# Patient Record
Sex: FEMALE | Race: BLACK OR AFRICAN AMERICAN | NOT HISPANIC OR LATINO | URBAN - METROPOLITAN AREA
[De-identification: names, ages, dates, MRNs, and addresses within clinical notes are randomized per-mention and may not be internally consistent; named-entity substitution may affect disease eponyms.]

---

## 2018-06-16 ENCOUNTER — EMERGENCY (EMERGENCY)
Facility: HOSPITAL | Age: 23
LOS: 0 days | Discharge: HOME | End: 2018-06-16
Attending: STUDENT IN AN ORGANIZED HEALTH CARE EDUCATION/TRAINING PROGRAM
Payer: COMMERCIAL

## 2018-06-16 VITALS
SYSTOLIC BLOOD PRESSURE: 102 MMHG | RESPIRATION RATE: 17 BRPM | HEART RATE: 78 BPM | DIASTOLIC BLOOD PRESSURE: 62 MMHG | TEMPERATURE: 98 F | OXYGEN SATURATION: 98 %

## 2018-06-16 VITALS
TEMPERATURE: 98 F | HEIGHT: 67 IN | WEIGHT: 293 LBS | OXYGEN SATURATION: 100 % | SYSTOLIC BLOOD PRESSURE: 93 MMHG | RESPIRATION RATE: 18 BRPM | DIASTOLIC BLOOD PRESSURE: 52 MMHG | HEART RATE: 81 BPM

## 2018-06-16 LAB
APPEARANCE UR: CLEAR — SIGNIFICANT CHANGE UP
BACTERIA # UR AUTO: ABNORMAL
BILIRUB UR-MCNC: NEGATIVE — SIGNIFICANT CHANGE UP
COLOR SPEC: YELLOW — SIGNIFICANT CHANGE UP
DIFF PNL FLD: ABNORMAL
EPI CELLS # UR: ABNORMAL
GLUCOSE UR QL: NEGATIVE MG/DL — SIGNIFICANT CHANGE UP
HCG UR QL: NEGATIVE — SIGNIFICANT CHANGE UP
KETONES UR-MCNC: NEGATIVE — SIGNIFICANT CHANGE UP
LEUKOCYTE ESTERASE UR-ACNC: ABNORMAL
NITRITE UR-MCNC: NEGATIVE — SIGNIFICANT CHANGE UP
PH UR: 7 — SIGNIFICANT CHANGE UP (ref 5–8)
PROT UR-MCNC: 15 MG/DL
RBC CASTS # UR COMP ASSIST: ABNORMAL /HPF (ref 0–4)
SP GR SPEC: 1.01 — SIGNIFICANT CHANGE UP (ref 1.01–1.02)
UROBILINOGEN FLD QL: 1 MG/DL
WBC UR QL: SIGNIFICANT CHANGE UP

## 2018-06-16 PROCEDURE — 76830 TRANSVAGINAL US NON-OB: CPT | Mod: 26

## 2018-06-16 PROCEDURE — 99284 EMERGENCY DEPT VISIT MOD MDM: CPT

## 2018-06-16 NOTE — ED ADULT NURSE NOTE - OBJECTIVE STATEMENT
pt states " I have been having right sided pelvic pain for the past 2 weeks." pt denies any bleeding or pain.

## 2018-06-16 NOTE — ED ADULT TRIAGE NOTE - CHIEF COMPLAINT QUOTE
pt presents to the ED with c/o pelvic pain states she thinks her IUD dislodged states GYN could not find it with intravaginal sonogram, denies vaginal bleeding our discharge

## 2018-06-16 NOTE — ED PROVIDER NOTE - CARE PLAN
Principal Discharge DX:	IUD check up
Requires maximal assistance to don lumbar brace seated at EOB/minimum assist (75% patients effort)

## 2018-06-16 NOTE — ED ADULT NURSE NOTE - ADDITIONAL PRINTED INSTRUCTIONS GIVEN
discharged home, instructed to follow up with GYN, Dr Grant, verbalized understanding of instructions

## 2018-06-16 NOTE — ED PROVIDER NOTE - OBJECTIVE STATEMENT
22 year old female presents today sent in by her gynecologist for an official sonogram, pt was in the office today to have her IUD removed, she did have a sono in the office but the physician was unable to visualize it, pt is concerned that it may dislodged and reports intermittent right sided pains (-) nausea or vomiting (-) vaginal bleeding (-) back pains

## 2018-06-16 NOTE — ED PROVIDER NOTE - PROGRESS NOTE DETAILS
endorsed by dr. ashley pending sono fo ? IUD dislodgement. IUD noted possibly in cervix. I examined pt and unable to find string. abd soft, pt nontoxic. d/w dr. vivar states for outpt hysteroscopic removal. Discussed results and outcome of testing with the patient.  Patient given copy of available results. Patient advised to please follow up with their PMD within the next 24 hours and return to the Emergency Department for worsening symptoms or any other concerns.

## 2018-06-16 NOTE — ED ADULT NURSE REASSESSMENT NOTE - NS ED NURSE REASSESS COMMENT FT1
assumed care at this time- patient is alert and orientedx4, no complaints made, no distress/discomfort observed

## 2018-06-18 DIAGNOSIS — R10.2 PELVIC AND PERINEAL PAIN: ICD-10-CM

## 2018-06-18 DIAGNOSIS — Z97.5 PRESENCE OF (INTRAUTERINE) CONTRACEPTIVE DEVICE: ICD-10-CM

## 2018-06-18 DIAGNOSIS — Z91.013 ALLERGY TO SEAFOOD: ICD-10-CM

## 2020-09-18 NOTE — ED ADULT NURSE NOTE - BREATH SOUNDS, MLM
Admit to ICU from ER on vent

PORTIAJAMESSHANNON PIKE admitted to ICU via gurney on cardiac monitor, intubated and being bagged by 
Respiratory Therapist. Patient transfered to bed, connected to mechanical ventilator by 
therapist, DANIKA at bedside.  Patient connected to ICU monitoring, weighed by bedscale, 
oriented to Augustine Shah, primary RN, unit, ventilator and sedation. Clear

## 2021-06-11 ENCOUNTER — TRANSCRIPTION ENCOUNTER (OUTPATIENT)
Age: 26
End: 2021-06-11

## 2021-06-12 ENCOUNTER — TRANSCRIPTION ENCOUNTER (OUTPATIENT)
Age: 26
End: 2021-06-12

## 2021-06-12 ENCOUNTER — INPATIENT (INPATIENT)
Facility: HOSPITAL | Age: 26
LOS: 0 days | Discharge: HOME | End: 2021-06-12
Attending: SURGERY | Admitting: SURGERY
Payer: COMMERCIAL

## 2021-06-12 ENCOUNTER — RESULT REVIEW (OUTPATIENT)
Age: 26
End: 2021-06-12

## 2021-06-12 VITALS
OXYGEN SATURATION: 94 % | HEART RATE: 85 BPM | SYSTOLIC BLOOD PRESSURE: 107 MMHG | RESPIRATION RATE: 18 BRPM | DIASTOLIC BLOOD PRESSURE: 61 MMHG

## 2021-06-12 VITALS
HEART RATE: 108 BPM | HEIGHT: 67 IN | RESPIRATION RATE: 20 BRPM | DIASTOLIC BLOOD PRESSURE: 89 MMHG | OXYGEN SATURATION: 100 % | TEMPERATURE: 99 F | SYSTOLIC BLOOD PRESSURE: 149 MMHG

## 2021-06-12 DIAGNOSIS — K35.80 UNSPECIFIED ACUTE APPENDICITIS: ICD-10-CM

## 2021-06-12 LAB
ALBUMIN SERPL ELPH-MCNC: 4 G/DL — SIGNIFICANT CHANGE UP (ref 3.3–5)
ALP SERPL-CCNC: 80 U/L — SIGNIFICANT CHANGE UP (ref 40–120)
ALT FLD-CCNC: 19 U/L — SIGNIFICANT CHANGE UP (ref 4–33)
ANION GAP SERPL CALC-SCNC: 12 MMOL/L — SIGNIFICANT CHANGE UP (ref 7–14)
APPEARANCE UR: ABNORMAL
AST SERPL-CCNC: 13 U/L — SIGNIFICANT CHANGE UP (ref 4–32)
BASOPHILS # BLD AUTO: 0.04 K/UL — SIGNIFICANT CHANGE UP (ref 0–0.2)
BASOPHILS NFR BLD AUTO: 0.4 % — SIGNIFICANT CHANGE UP (ref 0–2)
BILIRUB SERPL-MCNC: 0.2 MG/DL — SIGNIFICANT CHANGE UP (ref 0.2–1.2)
BILIRUB UR-MCNC: NEGATIVE — SIGNIFICANT CHANGE UP
BLD GP AB SCN SERPL QL: NEGATIVE — SIGNIFICANT CHANGE UP
BLOOD GAS VENOUS COMPREHENSIVE RESULT: SIGNIFICANT CHANGE UP
BUN SERPL-MCNC: 14 MG/DL — SIGNIFICANT CHANGE UP (ref 7–23)
CALCIUM SERPL-MCNC: 10.7 MG/DL — HIGH (ref 8.4–10.5)
CHLORIDE SERPL-SCNC: 105 MMOL/L — SIGNIFICANT CHANGE UP (ref 98–107)
CO2 SERPL-SCNC: 22 MMOL/L — SIGNIFICANT CHANGE UP (ref 22–31)
COLOR SPEC: SIGNIFICANT CHANGE UP
CREAT SERPL-MCNC: 0.81 MG/DL — SIGNIFICANT CHANGE UP (ref 0.5–1.3)
DIFF PNL FLD: ABNORMAL
EOSINOPHIL # BLD AUTO: 0.04 K/UL — SIGNIFICANT CHANGE UP (ref 0–0.5)
EOSINOPHIL NFR BLD AUTO: 0.4 % — SIGNIFICANT CHANGE UP (ref 0–6)
GLUCOSE SERPL-MCNC: 142 MG/DL — HIGH (ref 70–99)
GLUCOSE UR QL: NEGATIVE — SIGNIFICANT CHANGE UP
HCG SERPL-ACNC: <5 MIU/ML — SIGNIFICANT CHANGE UP
HCT VFR BLD CALC: 30.7 % — LOW (ref 34.5–45)
HCT VFR BLD CALC: 31.5 % — LOW (ref 34.5–45)
HGB BLD-MCNC: 9.1 G/DL — LOW (ref 11.5–15.5)
HGB BLD-MCNC: 9.4 G/DL — LOW (ref 11.5–15.5)
HIV 1+2 AB+HIV1 P24 AG SERPL QL IA: SIGNIFICANT CHANGE UP
IANC: 6.66 K/UL — SIGNIFICANT CHANGE UP (ref 1.5–8.5)
IMM GRANULOCYTES NFR BLD AUTO: 0.3 % — SIGNIFICANT CHANGE UP (ref 0–1.5)
KETONES UR-MCNC: NEGATIVE — SIGNIFICANT CHANGE UP
LEUKOCYTE ESTERASE UR-ACNC: NEGATIVE — SIGNIFICANT CHANGE UP
LIDOCAIN IGE QN: 20 U/L — SIGNIFICANT CHANGE UP (ref 7–60)
LYMPHOCYTES # BLD AUTO: 2.99 K/UL — SIGNIFICANT CHANGE UP (ref 1–3.3)
LYMPHOCYTES # BLD AUTO: 28.8 % — SIGNIFICANT CHANGE UP (ref 13–44)
MCHC RBC-ENTMCNC: 21 PG — LOW (ref 27–34)
MCHC RBC-ENTMCNC: 21.3 PG — LOW (ref 27–34)
MCHC RBC-ENTMCNC: 29.6 GM/DL — LOW (ref 32–36)
MCHC RBC-ENTMCNC: 29.8 GM/DL — LOW (ref 32–36)
MCV RBC AUTO: 70.5 FL — LOW (ref 80–100)
MCV RBC AUTO: 71.9 FL — LOW (ref 80–100)
MONOCYTES # BLD AUTO: 0.64 K/UL — SIGNIFICANT CHANGE UP (ref 0–0.9)
MONOCYTES NFR BLD AUTO: 6.2 % — SIGNIFICANT CHANGE UP (ref 2–14)
NEUTROPHILS # BLD AUTO: 6.66 K/UL — SIGNIFICANT CHANGE UP (ref 1.8–7.4)
NEUTROPHILS NFR BLD AUTO: 63.9 % — SIGNIFICANT CHANGE UP (ref 43–77)
NITRITE UR-MCNC: NEGATIVE — SIGNIFICANT CHANGE UP
NRBC # BLD: 0 /100 WBCS — SIGNIFICANT CHANGE UP
NRBC # BLD: 0 /100 WBCS — SIGNIFICANT CHANGE UP
NRBC # FLD: 0 K/UL — SIGNIFICANT CHANGE UP
NRBC # FLD: 0 K/UL — SIGNIFICANT CHANGE UP
PH UR: 5.5 — SIGNIFICANT CHANGE UP (ref 5–8)
PHOSPHATE SERPL-MCNC: 3.3 MG/DL — SIGNIFICANT CHANGE UP (ref 2.5–4.5)
PLATELET # BLD AUTO: 258 K/UL — SIGNIFICANT CHANGE UP (ref 150–400)
PLATELET # BLD AUTO: 287 K/UL — SIGNIFICANT CHANGE UP (ref 150–400)
POTASSIUM SERPL-MCNC: 4 MMOL/L — SIGNIFICANT CHANGE UP (ref 3.5–5.3)
POTASSIUM SERPL-SCNC: 4 MMOL/L — SIGNIFICANT CHANGE UP (ref 3.5–5.3)
PROT SERPL-MCNC: 7.1 G/DL — SIGNIFICANT CHANGE UP (ref 6–8.3)
PROT UR-MCNC: ABNORMAL
RBC # BLD: 4.27 M/UL — SIGNIFICANT CHANGE UP (ref 3.8–5.2)
RBC # BLD: 4.47 M/UL — SIGNIFICANT CHANGE UP (ref 3.8–5.2)
RBC # FLD: 18.6 % — HIGH (ref 10.3–14.5)
RBC # FLD: 18.7 % — HIGH (ref 10.3–14.5)
RH IG SCN BLD-IMP: POSITIVE — SIGNIFICANT CHANGE UP
RH IG SCN BLD-IMP: POSITIVE — SIGNIFICANT CHANGE UP
SARS-COV-2 RNA SPEC QL NAA+PROBE: SIGNIFICANT CHANGE UP
SODIUM SERPL-SCNC: 139 MMOL/L — SIGNIFICANT CHANGE UP (ref 135–145)
SP GR SPEC: 1.03 — HIGH (ref 1.01–1.02)
UROBILINOGEN FLD QL: SIGNIFICANT CHANGE UP
WBC # BLD: 10.4 K/UL — SIGNIFICANT CHANGE UP (ref 3.8–10.5)
WBC # BLD: 11.67 K/UL — HIGH (ref 3.8–10.5)
WBC # FLD AUTO: 10.4 K/UL — SIGNIFICANT CHANGE UP (ref 3.8–10.5)
WBC # FLD AUTO: 11.67 K/UL — HIGH (ref 3.8–10.5)

## 2021-06-12 PROCEDURE — 88304 TISSUE EXAM BY PATHOLOGIST: CPT | Mod: 26

## 2021-06-12 PROCEDURE — 74177 CT ABD & PELVIS W/CONTRAST: CPT | Mod: 26

## 2021-06-12 PROCEDURE — 99285 EMERGENCY DEPT VISIT HI MDM: CPT

## 2021-06-12 RX ORDER — FENTANYL CITRATE 50 UG/ML
50 INJECTION INTRAVENOUS
Refills: 0 | Status: DISCONTINUED | OUTPATIENT
Start: 2021-06-12 | End: 2021-06-12

## 2021-06-12 RX ORDER — IBUPROFEN 200 MG
1 TABLET ORAL
Qty: 0 | Refills: 0 | DISCHARGE
Start: 2021-06-12

## 2021-06-12 RX ORDER — HYDROMORPHONE HYDROCHLORIDE 2 MG/ML
1 INJECTION INTRAMUSCULAR; INTRAVENOUS; SUBCUTANEOUS
Refills: 0 | Status: DISCONTINUED | OUTPATIENT
Start: 2021-06-12 | End: 2021-06-12

## 2021-06-12 RX ORDER — OXYCODONE HYDROCHLORIDE 5 MG/1
1 TABLET ORAL
Qty: 18 | Refills: 0
Start: 2021-06-12 | End: 2021-06-14

## 2021-06-12 RX ORDER — PIPERACILLIN AND TAZOBACTAM 4; .5 G/20ML; G/20ML
3.38 INJECTION, POWDER, LYOPHILIZED, FOR SOLUTION INTRAVENOUS EVERY 8 HOURS
Refills: 0 | Status: DISCONTINUED | OUTPATIENT
Start: 2021-06-12 | End: 2021-06-12

## 2021-06-12 RX ORDER — ENOXAPARIN SODIUM 100 MG/ML
40 INJECTION SUBCUTANEOUS DAILY
Refills: 0 | Status: DISCONTINUED | OUTPATIENT
Start: 2021-06-12 | End: 2021-06-12

## 2021-06-12 RX ORDER — PIPERACILLIN AND TAZOBACTAM 4; .5 G/20ML; G/20ML
3.38 INJECTION, POWDER, LYOPHILIZED, FOR SOLUTION INTRAVENOUS ONCE
Refills: 0 | Status: COMPLETED | OUTPATIENT
Start: 2021-06-12 | End: 2021-06-12

## 2021-06-12 RX ORDER — HYDROMORPHONE HYDROCHLORIDE 2 MG/ML
0.5 INJECTION INTRAMUSCULAR; INTRAVENOUS; SUBCUTANEOUS
Refills: 0 | Status: DISCONTINUED | OUTPATIENT
Start: 2021-06-12 | End: 2021-06-12

## 2021-06-12 RX ORDER — OXYCODONE HYDROCHLORIDE 5 MG/1
5 TABLET ORAL EVERY 4 HOURS
Refills: 0 | Status: DISCONTINUED | OUTPATIENT
Start: 2021-06-12 | End: 2021-06-12

## 2021-06-12 RX ORDER — ONDANSETRON 8 MG/1
4 TABLET, FILM COATED ORAL ONCE
Refills: 0 | Status: DISCONTINUED | OUTPATIENT
Start: 2021-06-12 | End: 2021-06-12

## 2021-06-12 RX ORDER — ACETAMINOPHEN 500 MG
3 TABLET ORAL
Qty: 0 | Refills: 0 | DISCHARGE
Start: 2021-06-12

## 2021-06-12 RX ORDER — SODIUM CHLORIDE 9 MG/ML
1000 INJECTION, SOLUTION INTRAVENOUS
Refills: 0 | Status: DISCONTINUED | OUTPATIENT
Start: 2021-06-12 | End: 2021-06-12

## 2021-06-12 RX ORDER — MORPHINE SULFATE 50 MG/1
4 CAPSULE, EXTENDED RELEASE ORAL ONCE
Refills: 0 | Status: DISCONTINUED | OUTPATIENT
Start: 2021-06-12 | End: 2021-06-12

## 2021-06-12 RX ORDER — IBUPROFEN 200 MG
400 TABLET ORAL EVERY 6 HOURS
Refills: 0 | Status: DISCONTINUED | OUTPATIENT
Start: 2021-06-12 | End: 2021-06-12

## 2021-06-12 RX ORDER — SODIUM CHLORIDE 9 MG/ML
1000 INJECTION INTRAMUSCULAR; INTRAVENOUS; SUBCUTANEOUS ONCE
Refills: 0 | Status: COMPLETED | OUTPATIENT
Start: 2021-06-12 | End: 2021-06-12

## 2021-06-12 RX ORDER — KETOROLAC TROMETHAMINE 30 MG/ML
30 SYRINGE (ML) INJECTION ONCE
Refills: 0 | Status: DISCONTINUED | OUTPATIENT
Start: 2021-06-12 | End: 2021-06-12

## 2021-06-12 RX ORDER — ACETAMINOPHEN 500 MG
975 TABLET ORAL EVERY 6 HOURS
Refills: 0 | Status: DISCONTINUED | OUTPATIENT
Start: 2021-06-12 | End: 2021-06-12

## 2021-06-12 RX ADMIN — PIPERACILLIN AND TAZOBACTAM 200 GRAM(S): 4; .5 INJECTION, POWDER, LYOPHILIZED, FOR SOLUTION INTRAVENOUS at 10:30

## 2021-06-12 RX ADMIN — OXYCODONE HYDROCHLORIDE 5 MILLIGRAM(S): 5 TABLET ORAL at 16:45

## 2021-06-12 RX ADMIN — SODIUM CHLORIDE 1000 MILLILITER(S): 9 INJECTION INTRAMUSCULAR; INTRAVENOUS; SUBCUTANEOUS at 05:34

## 2021-06-12 RX ADMIN — MORPHINE SULFATE 4 MILLIGRAM(S): 50 CAPSULE, EXTENDED RELEASE ORAL at 05:49

## 2021-06-12 RX ADMIN — OXYCODONE HYDROCHLORIDE 5 MILLIGRAM(S): 5 TABLET ORAL at 17:28

## 2021-06-12 RX ADMIN — Medication 30 MILLIGRAM(S): at 05:37

## 2021-06-12 RX ADMIN — MORPHINE SULFATE 4 MILLIGRAM(S): 50 CAPSULE, EXTENDED RELEASE ORAL at 05:34

## 2021-06-12 RX ADMIN — Medication 30 MILLIGRAM(S): at 05:49

## 2021-06-12 NOTE — H&P ADULT - ASSESSMENT
25 year old obese female with no PMH presenting with acute appendicitis    Plan:  - Zosyn  - IVF  - NPO  - Added on for OR for laparoscopic appendectomy  - Admit to Dr Velasco    Surgery [id number]

## 2021-06-12 NOTE — DISCHARGE NOTE NURSING/CASE MANAGEMENT/SOCIAL WORK - PATIENT PORTAL LINK FT
You can access the FollowMyHealth Patient Portal offered by [facility] by registering at the following website: [email or web address]. By joining [employer]’s FollowMyHealth portal, you will also be able to view your health information using other applications (apps) compatible with our system.

## 2021-06-12 NOTE — ED PROVIDER NOTE - CLINICAL SUMMARY MEDICAL DECISION MAKING FREE TEXT BOX
26yo F with acute onset RLQ pain at 11:30PM, no nausea, vomiting, fevers. never had similar pain. currently menstruating. lower abdominal pain R>L. concern for possible appendicitis, ovarian torsion, ruptured ovarian cyst. will check labs, hcg, CTAP, transvaginal US. analgesia and reassess. 24yo F with acute onset RLQ pain at 11:30PM, no nausea, vomiting, fevers. never had similar pain. currently menstruating. lower abdominal pain R>L. concern for possible appendicitis, ovarian torsion, ruptured ovarian cyst. will check labs, hcg, CTAP, transvaginal US. analgesia and reassess. Accepted to surgery for OR. 24yo F with acute onset RLQ pain at 11:30PM, no nausea, vomiting, fevers. never had similar pain. currently menstruating. lower abdominal pain R>L. concern for possible appendicitis, ovarian torsion, ruptured ovarian cyst. will check labs, hcg, CTAP, transvaginal US. analgesia and reassess. Accepted to surgery for OR.    aj 24 yo obese woman, comes with abd pain.  has an iud (unclear placement) my exam tender rlq and ruq.  abd soft.  ct shows appendicitis- surgery to see, I signed out 8am pending surgery eval

## 2021-06-12 NOTE — ED ADULT NURSE NOTE - OBJECTIVE STATEMENT
pt received to room 28, c/o periumbilical/right sided abdominal pain severe starting at rest at 11pm. is on menstrual cycle. has IUD. pt moaning in pain, tearful. 20G IV placed right AC, labs sent. medicated per MD order.

## 2021-06-12 NOTE — H&P ADULT - NSHPPHYSICALEXAM_GEN_ALL_CORE
General: alert and oriented, NAD  Resp: airway patent, respirations unlabored  CVS: regular rate and rhythm  Abdomen: soft, obese, tender to palpation in periumbilical and RLQ, nondistended  Extremities: no edema  Skin: warm, dry, appropriate color

## 2021-06-12 NOTE — H&P ADULT - HISTORY OF PRESENT ILLNESS
24yo obese female with no significant PMH presenting with right-sided lower abdominal pain. Pain started as sharp in character that was diffusely across the juan-umbilical region at ~11:30PM last night, and has now shifted right of midline. Pain became unbearable and prompted patient to come to ED. She states it is not directly hindering her ability to move/function but pain was originally 9/10 on rotational/uprighting movements. She received 1x dose of 30mg Toradol and 4mg morphine in ED, pain is now 4/10. She denies fevers, chills, nausea, vomiting, diarrhea.

## 2021-06-12 NOTE — ED ADULT TRIAGE NOTE - CHIEF COMPLAINT QUOTE
abd pain    c/o pain around the umbilicus rad to the right side depending on her position since 11pm.  had normal bm approx 30 mins ago. appears uncomfortable

## 2021-06-12 NOTE — ASU DISCHARGE PLAN (ADULT/PEDIATRIC) - CARE PROVIDER_API CALL
Cosme Velasco)  Surgery; Surgical Critical Care  [address] [place] [address]  Phone: [phone]  Fax: [phone]  Follow Up Time: 2 weeks

## 2021-06-12 NOTE — ED PROVIDER NOTE - ATTENDING CONTRIBUTION TO CARE
aj 24 yo obese woman, comes with abd pain.  has an iud (unclear placement) my exam tender rlq and ruq.  abd soft.  ct shows appendicitis- surgery to see, I signed out 8am pending surgery eval    I performed a history and physical exam of the patient and discussed their management with the resident and /or advanced care provider. I reviewed the resident and /or ACP's note and agree with the documented findings and plan of care. My medical decison making and observations are found above.

## 2021-06-12 NOTE — H&P ADULT - ATTENDING COMMENTS
Acute Appendicitis    a.  Admit to B surgery / RYLAN TIM  b.  Nil per OS  c.  Start IVF  d.  Start IV antibiotics  e.  DVT prophylaxis with Lovenox  f.   Plan for laparoscopic appendectomy.  I have discussed the risks which include but are not limited to bleeding, surgical site infections, injury to adjacent viscera.  Also discussed were the benefits and the alternatives which include antibiotic regimen, with or without interval appendectomy, prognosis and expected recovery from disease process

## 2021-06-12 NOTE — CONSULT NOTE ADULT - SUBJECTIVE AND OBJECTIVE BOX
24yo obese female with no significant PMH presenting with right-sided lower abdominal pain. Pain started as sharp in character that was diffusely across the juan-umbilical region at ~11:30PM last night, and has now shifted right of midline in a more localized manner. Pain became unbearable and prompted patient to come to ED.  She states it is not directly hindering her ability to move/function but pain was originally 9/10 on rotational/uprighting movements. She received 1x dose of 30mg Toradol and 4mg morphine in ED, pain is now 4/10. She thinks that this could possibly be caused by a  infection from a displaced/lost IUD last month or her appendix, but is unsure of the problem. She otherwise denies f/c/n/v, dysuria, vaginal discharge.    Vital Signs Last 24 Hrs  T(C): 36.8 (12 Jun 2021 08:50), Max: 37.2 (12 Jun 2021 04:34)  T(F): 98.2 (12 Jun 2021 08:50), Max: 98.9 (12 Jun 2021 04:34)  HR: 72 (12 Jun 2021 08:50) (72 - 108)  BP: 114/50 (12 Jun 2021 08:50) (114/50 - 149/89)  RR: 18 (12 Jun 2021 08:50) (18 - 20)  SpO2: 100% (12 Jun 2021 08:50) (100% - 100%)    Physical Exam:  Gen: AAOx3, obese, fully supine in hospital bed.  HEENT: NC/AT, PERRLA, mucous membranes moist, airway patent.  Resp: CTA b/l, equal chest rise.  Cardio: RRR, normal S1, S2  GI: localized pain on deep palpation of the RLQ just lateral to midline at the level of the umbilicus, +McBurney's sign, +Psoas sign, +Obturator sign. No rebound/guarding. Remaining quadrants without tenderness, distension. No CVA tenderness. No hepatosplenomegaly. No other masses/lesions palpated or visualized.  EXT: no cyanosis, clubbing, edema, DP/PT pulses present b/l.  Skin: intact, no rashes, no lesions, no erythema.     Rad: CT Abdomen and Pelvis w/IV contrast  IMPRESSION:  Acute uncomplicated appendicitis. Adjacent clustered medial pericecal lymph nodes (9 mm max short axis).      CBC Full  -  ( 12 Jun 2021 06:11 )  WBC Count : 10.40 K/uL  RBC Count : 4.47 M/uL  Hemoglobin : 9.4 g/dL  Hematocrit : 31.5 %  Platelet Count - Automated : 287 K/uL  Mean Cell Volume : 70.5 fL  Mean Cell Hemoglobin : 21.0 pg  Mean Cell Hemoglobin Concentration : 29.8 gm/dL  Auto Neutrophil # : 6.66 K/uL  Auto Lymphocyte # : 2.99 K/uL  Auto Monocyte # : 0.64 K/uL  Auto Eosinophil # : 0.04 K/uL  Auto Basophil # : 0.04 K/uL  Auto Neutrophil % : 63.9 %  Auto Lymphocyte % : 28.8 %  Auto Monocyte % : 6.2 %  Auto Eosinophil % : 0.4 %  Auto Basophil % : 0.4 %

## 2021-06-12 NOTE — ED ADULT NURSE NOTE - NSIMPLEMENTINTERV_GEN_ALL_ED
Implemented All Universal Safety Interventions:  [place] to call system. Call bell, personal items and telephone within reach. Instruct patient to call for assistance. Room bathroom lighting operational. Non-slip footwear when patient is off stretcher. Physically safe environment: no spills, clutter or unnecessary equipment. Stretcher in lowest position, wheels locked, appropriate side rails in place.

## 2021-06-12 NOTE — ED PROVIDER NOTE - OBJECTIVE STATEMENT
24yo F Hx of morbid obesity presenting with complaints of abdominal pain. onset of RLQ abdominal pain around 11:30 PM, sharp in nature moves to mid lower abdomen when changing her positions. never had similar pain in the past, no nausea, vomiting, diarrhea. pain is constant. no dysuria. currently on her menstrual cycle, no foul smelling discharge. had IUD went to OB 1 month ago and it wasn't present but never had it removed and didn't notice it come out. sexually active and uses condoms.

## 2021-06-12 NOTE — CONSULT NOTE ADULT - ASSESSMENT
A/P: 26yo obese female with RLQ pain demonstrating clinical and radiographic findings c/w acute appendicitis.  -NPO until otherwise instructed  -LR @ 125ml/hr   -Pain control as per ED, Toradol/morphine  -Plan for OR with surgery, please obtain B-HCG, Coags, ECG, UA

## 2021-06-12 NOTE — ASU DISCHARGE PLAN (ADULT/PEDIATRIC) - ASU DC SPECIAL INSTRUCTIONSFT
Please take tylenol and ibuprofen for pain.  If you still have pain take oxycodone.    You may shower but do not scrub the wound.  Pat to dry do not rub the wound.    Have a regular diet.     Please follow up with Dr. Velasco in the office in 2 weeks.

## 2021-06-17 LAB — SURGICAL PATHOLOGY STUDY: SIGNIFICANT CHANGE UP

## 2021-06-25 ENCOUNTER — APPOINTMENT (OUTPATIENT)
Dept: TRAUMA SURGERY | Facility: HOSPITAL | Age: 26
End: 2021-06-25

## 2021-07-26 PROBLEM — Z00.00 ENCOUNTER FOR PREVENTIVE HEALTH EXAMINATION: Status: ACTIVE | Noted: 2021-07-26

## 2021-11-02 ENCOUNTER — EMERGENCY (EMERGENCY)
Facility: HOSPITAL | Age: 26
LOS: 1 days | Discharge: HOME | End: 2021-11-02
Attending: EMERGENCY MEDICINE | Admitting: EMERGENCY MEDICINE
Payer: COMMERCIAL

## 2021-11-02 VITALS
OXYGEN SATURATION: 99 % | HEART RATE: 87 BPM | TEMPERATURE: 100 F | RESPIRATION RATE: 18 BRPM | DIASTOLIC BLOOD PRESSURE: 75 MMHG | SYSTOLIC BLOOD PRESSURE: 131 MMHG | HEIGHT: 67 IN

## 2021-11-02 VITALS
OXYGEN SATURATION: 99 % | SYSTOLIC BLOOD PRESSURE: 132 MMHG | TEMPERATURE: 98 F | RESPIRATION RATE: 16 BRPM | DIASTOLIC BLOOD PRESSURE: 83 MMHG | HEART RATE: 82 BPM

## 2021-11-02 DIAGNOSIS — Z90.49 ACQUIRED ABSENCE OF OTHER SPECIFIED PARTS OF DIGESTIVE TRACT: Chronic | ICD-10-CM

## 2021-11-02 LAB
ALBUMIN SERPL ELPH-MCNC: 4 G/DL — SIGNIFICANT CHANGE UP (ref 3.3–5)
ALP SERPL-CCNC: 81 U/L — SIGNIFICANT CHANGE UP (ref 40–120)
ALT FLD-CCNC: 17 U/L — SIGNIFICANT CHANGE UP (ref 4–33)
ANION GAP SERPL CALC-SCNC: 9 MMOL/L — SIGNIFICANT CHANGE UP (ref 7–14)
APPEARANCE UR: CLEAR — SIGNIFICANT CHANGE UP
APTT BLD: 28.9 SEC — SIGNIFICANT CHANGE UP (ref 27–36.3)
AST SERPL-CCNC: 10 U/L — SIGNIFICANT CHANGE UP (ref 4–32)
BASOPHILS # BLD AUTO: 0.03 K/UL — SIGNIFICANT CHANGE UP (ref 0–0.2)
BASOPHILS NFR BLD AUTO: 0.3 % — SIGNIFICANT CHANGE UP (ref 0–2)
BILIRUB SERPL-MCNC: 0.3 MG/DL — SIGNIFICANT CHANGE UP (ref 0.2–1.2)
BILIRUB UR-MCNC: NEGATIVE — SIGNIFICANT CHANGE UP
BLD GP AB SCN SERPL QL: NEGATIVE — SIGNIFICANT CHANGE UP
BLOOD GAS VENOUS COMPREHENSIVE RESULT: SIGNIFICANT CHANGE UP
BUN SERPL-MCNC: 14 MG/DL — SIGNIFICANT CHANGE UP (ref 7–23)
CALCIUM SERPL-MCNC: 10.4 MG/DL — SIGNIFICANT CHANGE UP (ref 8.4–10.5)
CHLORIDE SERPL-SCNC: 102 MMOL/L — SIGNIFICANT CHANGE UP (ref 98–107)
CO2 SERPL-SCNC: 24 MMOL/L — SIGNIFICANT CHANGE UP (ref 22–31)
COLOR SPEC: SIGNIFICANT CHANGE UP
CREAT SERPL-MCNC: 0.82 MG/DL — SIGNIFICANT CHANGE UP (ref 0.5–1.3)
DIFF PNL FLD: ABNORMAL
EOSINOPHIL # BLD AUTO: 0.16 K/UL — SIGNIFICANT CHANGE UP (ref 0–0.5)
EOSINOPHIL NFR BLD AUTO: 1.5 % — SIGNIFICANT CHANGE UP (ref 0–6)
GLUCOSE SERPL-MCNC: 130 MG/DL — HIGH (ref 70–99)
GLUCOSE UR QL: NEGATIVE — SIGNIFICANT CHANGE UP
HCG SERPL-ACNC: <5 MIU/ML — SIGNIFICANT CHANGE UP
HCT VFR BLD CALC: 30.8 % — LOW (ref 34.5–45)
HGB BLD-MCNC: 9 G/DL — LOW (ref 11.5–15.5)
IANC: 5.93 K/UL — SIGNIFICANT CHANGE UP (ref 1.5–8.5)
IMM GRANULOCYTES NFR BLD AUTO: 0.4 % — SIGNIFICANT CHANGE UP (ref 0–1.5)
INR BLD: 1.1 RATIO — SIGNIFICANT CHANGE UP (ref 0.88–1.16)
KETONES UR-MCNC: NEGATIVE — SIGNIFICANT CHANGE UP
LEUKOCYTE ESTERASE UR-ACNC: NEGATIVE — SIGNIFICANT CHANGE UP
LYMPHOCYTES # BLD AUTO: 3.6 K/UL — HIGH (ref 1–3.3)
LYMPHOCYTES # BLD AUTO: 34.8 % — SIGNIFICANT CHANGE UP (ref 13–44)
MCHC RBC-ENTMCNC: 21.1 PG — LOW (ref 27–34)
MCHC RBC-ENTMCNC: 29.2 GM/DL — LOW (ref 32–36)
MCV RBC AUTO: 72.1 FL — LOW (ref 80–100)
MONOCYTES # BLD AUTO: 0.58 K/UL — SIGNIFICANT CHANGE UP (ref 0–0.9)
MONOCYTES NFR BLD AUTO: 5.6 % — SIGNIFICANT CHANGE UP (ref 2–14)
NEUTROPHILS # BLD AUTO: 5.93 K/UL — SIGNIFICANT CHANGE UP (ref 1.8–7.4)
NEUTROPHILS NFR BLD AUTO: 57.4 % — SIGNIFICANT CHANGE UP (ref 43–77)
NITRITE UR-MCNC: NEGATIVE — SIGNIFICANT CHANGE UP
NRBC # BLD: 0 /100 WBCS — SIGNIFICANT CHANGE UP
NRBC # FLD: 0 K/UL — SIGNIFICANT CHANGE UP
PH UR: 5.5 — SIGNIFICANT CHANGE UP (ref 5–8)
PLATELET # BLD AUTO: 382 K/UL — SIGNIFICANT CHANGE UP (ref 150–400)
POTASSIUM SERPL-MCNC: 4.2 MMOL/L — SIGNIFICANT CHANGE UP (ref 3.5–5.3)
POTASSIUM SERPL-SCNC: 4.2 MMOL/L — SIGNIFICANT CHANGE UP (ref 3.5–5.3)
PROT SERPL-MCNC: 7.4 G/DL — SIGNIFICANT CHANGE UP (ref 6–8.3)
PROT UR-MCNC: ABNORMAL
PROTHROM AB SERPL-ACNC: 12.5 SEC — SIGNIFICANT CHANGE UP (ref 10.6–13.6)
RBC # BLD: 4.27 M/UL — SIGNIFICANT CHANGE UP (ref 3.8–5.2)
RBC # FLD: 18.7 % — HIGH (ref 10.3–14.5)
RH IG SCN BLD-IMP: POSITIVE — SIGNIFICANT CHANGE UP
SODIUM SERPL-SCNC: 135 MMOL/L — SIGNIFICANT CHANGE UP (ref 135–145)
SP GR SPEC: 1.03 — SIGNIFICANT CHANGE UP (ref 1–1.05)
UROBILINOGEN FLD QL: SIGNIFICANT CHANGE UP
WBC # BLD: 10.34 K/UL — SIGNIFICANT CHANGE UP (ref 3.8–10.5)
WBC # FLD AUTO: 10.34 K/UL — SIGNIFICANT CHANGE UP (ref 3.8–10.5)

## 2021-11-02 PROCEDURE — 74177 CT ABD & PELVIS W/CONTRAST: CPT | Mod: 26,MG

## 2021-11-02 PROCEDURE — 99285 EMERGENCY DEPT VISIT HI MDM: CPT

## 2021-11-02 PROCEDURE — 76770 US EXAM ABDO BACK WALL COMP: CPT | Mod: 26

## 2021-11-02 PROCEDURE — G1004: CPT

## 2021-11-02 PROCEDURE — 76830 TRANSVAGINAL US NON-OB: CPT | Mod: 26

## 2021-11-02 RX ORDER — SODIUM CHLORIDE 9 MG/ML
1000 INJECTION INTRAMUSCULAR; INTRAVENOUS; SUBCUTANEOUS ONCE
Refills: 0 | Status: COMPLETED | OUTPATIENT
Start: 2021-11-02 | End: 2021-11-02

## 2021-11-02 RX ORDER — KETOROLAC TROMETHAMINE 30 MG/ML
15 SYRINGE (ML) INJECTION ONCE
Refills: 0 | Status: DISCONTINUED | OUTPATIENT
Start: 2021-11-02 | End: 2021-11-02

## 2021-11-02 RX ORDER — MORPHINE SULFATE 50 MG/1
4 CAPSULE, EXTENDED RELEASE ORAL ONCE
Refills: 0 | Status: DISCONTINUED | OUTPATIENT
Start: 2021-11-02 | End: 2021-11-02

## 2021-11-02 RX ADMIN — SODIUM CHLORIDE 1000 MILLILITER(S): 9 INJECTION INTRAMUSCULAR; INTRAVENOUS; SUBCUTANEOUS at 07:23

## 2021-11-02 RX ADMIN — SODIUM CHLORIDE 1000 MILLILITER(S): 9 INJECTION INTRAMUSCULAR; INTRAVENOUS; SUBCUTANEOUS at 05:41

## 2021-11-02 RX ADMIN — Medication 15 MILLIGRAM(S): at 08:30

## 2021-11-02 RX ADMIN — Medication 15 MILLIGRAM(S): at 07:16

## 2021-11-02 RX ADMIN — MORPHINE SULFATE 4 MILLIGRAM(S): 50 CAPSULE, EXTENDED RELEASE ORAL at 05:41

## 2021-11-02 NOTE — ED ADULT NURSE NOTE - OBJECTIVE STATEMENT
Patient A&Ox4 ambulatory c/o right sided pelvic pain radiating to right flank x2 days. States improvement when sitting up right. Patient states she had her appendix removed August/2021, denies any complications. Respirations even and unlabored. Abdomen round, soft, tenderness observed to right lower quadrant. Tenderness to right flank observed. LMP 10/05/21. Denies any urinary symptoms. Vitals as noted. 20G IV placed to left arm, labs collected and sent. Stretcher in lowest position, wheels locked, appropriate side rails in place, call bell in reach.

## 2021-11-02 NOTE — ED PROVIDER NOTE - NSFOLLOWUPINSTRUCTIONS_ED_ALL_ED_FT
We did not find any acute findings on your labs, kidney ultrasound, or CT scan of your abdomen/pelvis. We were unable to visualize your ovaries on the ultrasound, but they appeared normal on CT scan.    Follow up with your primary care physician in 24-48 hours - take copies of your results.      Return to the Emergency Department for worsening or persistent symptoms, and/or ANY NEW OR CONCERNING SYMPTOMS, including fevers, severe pain, inability to eat/drink. If you have issues obtaining follow up, please call: [phone] to obtain a doctor or specialist who takes your insurance in your area.

## 2021-11-02 NOTE — ED PROVIDER NOTE - PROGRESS NOTE DETAILS
[place]ilton, PGY3 - Received pt as sign-out ~07:00. RLQ/R flank pain. Labs not actionable, US kidney without hydro or e/o stone; TVUS unable to visualize ovaries due to gas. UA negative for UTI & not suggestive of stone, thus will pursue CT A/P to eval for etiology. Pt reevaluated, pain currently controlled, updated on results & plan for CT N[place], PGY3 – Pt was re-evaluated at bedside, VSS, pain improved. Tolerated PO. CT not actionable. Results were discussed with patient as well as return precautions and follow up plan with PCP and/or specialist. Time was taken to answer any questions that the patient had before providing them with discharge paperwork.

## 2021-11-02 NOTE — ED ADULT TRIAGE NOTE - CHIEF COMPLAINT QUOTE
pt c/o having right flank pain radiating to pelvis starting Sunday with increase urinary frequency. Denies PMH

## 2021-11-02 NOTE — ED PROVIDER NOTE - OBJECTIVE STATEMENT
27 yo F with no known Past Medical History that is s/p appendectomy June 2021 that presents with Right pelvic/flank pain 10/10, severe, constant, radiating to back/flank started suddenly 2 days prior to arrival, no pain meds taken. Thought initially would go away but worsened leading to her arrival to ED. Denies any rashes, falls, trauma leading to onset. Has never had this before. Has associated increased freq of urination but no hematuria noted. Denies fever but has had chills, no N/V/D, vaginal bleeding or discharge. LMP 1 month ago. Non smoker.

## 2021-11-02 NOTE — ED ADULT NURSE REASSESSMENT NOTE - NS ED NURSE REASSESS COMMENT FT1
pt. A&Ox4, awake and resting. pt. endorses pain as a 7. pt. states pain is in the RLQ radiating to the back and flank. pt, states pain is better when sitting up, and worse when laying down. pt. prefers repositioning for pain control at this time. MD Callaway aware of pt. complaints. VS as noted. pt. received US Transvaginal, Kidney and Bladder. pending Urine results.
pt. A&Ox4, awake and resting. pt. reports a pain of 5 in the RLQ radiating to the back and right flank. PO Challenge initiated as per MD. if pt. able to tolerate PO, pt. will be d/c.

## 2021-11-02 NOTE — ED PROVIDER NOTE - ATTENDING CONTRIBUTION TO CARE
I, Dr Mikey Garcia wrote the initial note in its entirety and the resident only contributed to the progress note and disposition with which I agree.

## 2021-11-02 NOTE — ED PROVIDER NOTE - CLINICAL SUMMARY MEDICAL DECISION MAKING FREE TEXT BOX
25 yo F with no Past Medical History but s/p appedectomy in June 2021 that presents with 2 days of worsening right pelvic and flank pain with increased freq of urination compared to normal without hematuria, + chills but no fever, N/V/D, dysuria. Concern for a child bearing age female is ovarian torsion but also consider UTI vs pyelo vs kidney stone. Will require pain meds, IVF, obtain labs and imaging and reassess.

## 2021-11-02 NOTE — ED ADULT NURSE NOTE - NSFALLRSKASSESSDT_ED_ALL_ED
Subjective:    HPI                    Objective:    System    Physical Exam    General     ROS    Assessment/Plan:      SUBJECTIVE  Subjective: Had a repeat MRI and was told the fx is more defined now.  Pt states she wasn't told if that means the fx is worse or just a better picture.  Due to the pain being worse she has tried to go NWB with crutches again.  This has helped get rid of the constant pain   Current Pain level: 6/10   Changes in function:  None     Adverse reaction to treatment or activity:  WBing    OBJECTIVE  Objective: Gait: NWB with crutches.  Fair to poor L quad set     ASSESSMENT  Talia continues to require intervention to meet STG and LTG's: PT  Patient has experienced an exacerbation of symptoms.  Response to therapy has shown lack of progress in  pain level, muscle control and gait  Progress made towards STG/LTG?  None    PLAN  Continue current treatment plan until patient demonstrates readiness to progress to higher level exercises.  Added NMES to POC    PTA/ATC plan:  N/A    Please refer to the daily flowsheet for treatment today, total treatment time and time spent performing 1:1 timed codes.              
02-Nov-2021 07:49

## 2021-11-02 NOTE — ED PROVIDER NOTE - PATIENT PORTAL LINK FT
You can access the FollowMyHealth Patient Portal offered by [facility] by registering at the following website: [email or web address]. By joining [employer]’s FollowMyHealth portal, you will also be able to view your health information using other applications (apps) compatible with our system.

## 2021-11-04 LAB
CULTURE RESULTS: SIGNIFICANT CHANGE UP
SPECIMEN SOURCE: SIGNIFICANT CHANGE UP

## 2023-06-07 NOTE — ED ADULT NURSE NOTE - CAS TRG GENERAL AIRWAY, MLM
For information on Fall & Injury Prevention, visit: [email or web address] OR  [email or web address] OR  https://www.cdc.gov/steadi/patient.html Patent

## 2023-08-09 NOTE — BRIEF OPERATIVE NOTE - NSICDXBRIEFPROCEDURE_GEN_ALL_CORE_FT
PROCEDURES:  Appendectomy, laparoscopic 12-Jun-2021 15:36:34  Ramírez Pulido  
Consistent Carbohydrate Diabetic Diets

## 2023-08-10 DIAGNOSIS — Z71.89 OTHER SPECIFIED COUNSELING: ICD-10-CM

## 2023-08-10 DIAGNOSIS — E66.01 MORBID (SEVERE) OBESITY DUE TO EXCESS CALORIES: ICD-10-CM

## 2023-08-10 DIAGNOSIS — Z13.21 ENCOUNTER FOR SCREENING FOR NUTRITIONAL DISORDER: ICD-10-CM

## 2023-08-10 DIAGNOSIS — Z13.0 ENCOUNTER FOR SCREENING FOR DISEASES OF THE BLOOD AND BLOOD-FORMING ORGANS AND CERTAIN DISORDERS INVOLVING THE IMMUNE MECHANISM: ICD-10-CM

## 2023-08-10 DIAGNOSIS — Z13.0 ENCOUNTER FOR SCREENING FOR OTHER SUSPECTED ENDOCRINE DISORDER: ICD-10-CM

## 2023-08-10 DIAGNOSIS — Z13.228 ENCOUNTER FOR SCREENING FOR OTHER SUSPECTED ENDOCRINE DISORDER: ICD-10-CM

## 2023-08-10 DIAGNOSIS — Z01.818 ENCOUNTER FOR OTHER PREPROCEDURAL EXAMINATION: ICD-10-CM

## 2023-08-10 DIAGNOSIS — Z13.29 ENCOUNTER FOR SCREENING FOR OTHER SUSPECTED ENDOCRINE DISORDER: ICD-10-CM

## 2023-08-11 ENCOUNTER — APPOINTMENT (OUTPATIENT)
Dept: SURGERY | Facility: CLINIC | Age: 28
End: 2023-08-11

## 2023-10-04 NOTE — ED ADULT NURSE NOTE - NSHISCREENINGQ1_ED_A_ED
Both nodule benign on repeat biopsy. 
Right and left nodules are benign on biopsy.      Nursing advice: please inform patient that on repeat, results have returned as benign.  We should keep our appointment to discuss follow-up. 
No

## 2024-01-01 NOTE — ED PROVIDER NOTE - EYES, MLM
Problem: Respiratory Impairment - Respiratory Therapy 253  Intervention: Respiratory support devices  Note: Intervention Status  Done      Clear bilaterally, pupils equal, round and reactive to light.

## 2024-04-03 NOTE — ED ADULT TRIAGE NOTE - HEART RATE (BEATS/MIN)
The patient has been examined and the H&P has been reviewed:    I concur with the findings and no changes have occurred since H&P was written.    Anesthesia/Surgery risks, benefits and alternative options discussed and understood by patient/family.          There are no hospital problems to display for this patient.    
87

## 2024-07-01 NOTE — ED ADULT NURSE NOTE - CAS DISCH CONDITION
"     Db is growing and developing well.  Make sure to continue wearing seat belts and helmets for riding bikes or scooters.     Parents should review online safety for their adolescent children including privacy and over-sharing.  Screen time (including TV, computer, tablets, phones) should be limited to 2 hours a day to encourage activity and allow for social development and family time.     We discussed physical activity and nutritional requirements today.     We gave 2nd HPV and Tdap, Menveo this year.      Vaccine Information Sheets were offered and counseling on vaccine side effects was given.  Side effects most commonly include fever, redness at the injection site, or swelling at the site.  Younger children may be fussy and older children may complain of pain. You can use acetaminophen at any age or ibuprofen for age 6 months and up.  Much more rarely, call back or go to the ER if your child has inconsolable crying, wheezing, difficulty breathing, or other concerns.          You should start discussing body changes than can occur with puberty starting at this age if you haven't already.  There are many books out there that you could review first and give to your child if desired.  For girls, a good start is the two step series \"The Care and Keeping of You.”  The first book is by Tiffany Lopze and the second one is by Nat Daniel.  For boys, a good start is “Karl Stuff:  The Body Book for Boys” also by Nat Daniel.      For older boys and girls an older option is the \"What's Happening to my Body Book For Boys/Girls\" by Sravani Bojorquez and Iron Bojorquez.  There is one for each gender, but this option leaves nothing to the imagination so make sure to review it yourself. Often times schools will start to teach some of these things in 5th grade and many parents would rather have those discussions first on their own.      As you start to enter the challenging years of raising an adolescent, additional " "helpful books include \"How to Raise an Adult: Break Free of the Overparenting Trap and Prepare Your Kid for Success\" by Radha Gregory and \"The Teenage Brain\" by Luann Cyr is a resource to learn about typical developmental processes in adolescent brain maturation in both boys and girls.  For parents of boys, look into “Decoding Boys: New Science Behind the Subtle Art of Raising Sons” by Nat Daniel.  \"Untangled\" by Heather Garza is a great book for parents of girls.           " Stable